# Patient Record
Sex: FEMALE | Race: WHITE | Employment: FULL TIME | URBAN - METROPOLITAN AREA
[De-identification: names, ages, dates, MRNs, and addresses within clinical notes are randomized per-mention and may not be internally consistent; named-entity substitution may affect disease eponyms.]

---

## 2018-04-04 PROBLEM — J30.9 ALLERGIC RHINITIS, UNSPECIFIED SEASONALITY, UNSPECIFIED TRIGGER: Status: ACTIVE | Noted: 2018-04-04

## 2018-04-11 PROBLEM — J30.1 CHRONIC SEASONAL ALLERGIC RHINITIS DUE TO POLLEN: Status: ACTIVE | Noted: 2018-04-11

## 2018-04-11 PROBLEM — J30.89 ALLERGIC RHINITIS DUE TO AMERICAN HOUSE DUST MITE: Status: ACTIVE | Noted: 2018-04-11

## 2018-09-13 ENCOUNTER — OFFICE VISIT (OUTPATIENT)
Dept: FAMILY MEDICINE CLINIC | Facility: CLINIC | Age: 43
End: 2018-09-13
Payer: MEDICAID

## 2018-09-13 VITALS
TEMPERATURE: 98 F | SYSTOLIC BLOOD PRESSURE: 112 MMHG | HEART RATE: 72 BPM | WEIGHT: 130 LBS | RESPIRATION RATE: 16 BRPM | BODY MASS INDEX: 21.66 KG/M2 | DIASTOLIC BLOOD PRESSURE: 74 MMHG | OXYGEN SATURATION: 99 % | HEIGHT: 65 IN

## 2018-09-13 DIAGNOSIS — J00 ACUTE NASOPHARYNGITIS: Primary | ICD-10-CM

## 2018-09-13 PROCEDURE — 99213 OFFICE O/P EST LOW 20 MIN: CPT | Performed by: NURSE PRACTITIONER

## 2018-09-14 NOTE — PROGRESS NOTES
CHIEF COMPLAINT:   Patient presents with:  Sinus Problem: headache, sinus pressure, runny nose, nose congestion, drainage x 1 dy took OTC and helped       HPI:   Rashaun Matamoros is a 41year old female who presents for sinus congestion for 1 day.   Symptoms ha SKIN: no rashes,no suspicious lesions  HEAD: atraumatic, normocephalic,  no tenderness on palpation of sinuses  EYES: conjunctiva clear, EOM intact  EARS: TM's clear gray, no bulging, no retraction, no fluid, bony landmarks visualized.    NOSE: nostrils pat · You breathe in a virus from the air. This can happen when someone with a cold sneezes or coughs near you. · You touch your eyes, nose, or mouth when your hand has a cold virus on it. This can happen if you touch an object that has the cold virus on it. · Cover your mouth and nose when you cough or sneeze. Throw away tissues after using them. · Disinfect things you touch often, such as phones and keyboards.    · Stay home when you have a cold. What are the possible complications of a cold virus?   Colds

## 2018-09-14 NOTE — PATIENT INSTRUCTIONS
Tylenol for discomfort as packet insert  Dayquil as packet insert  Nasal spray as previously prescribed   Rest and fluids    Follow-up if not improving         Understanding the Cold Virus  Colds are the most common illness that people get.  Most adults get them to children. Contact your healthcare provider if you have any questions about using cold medicines safely. Can a cold be prevented? You can help reduce the spread of cold viruses. This can help both you and others avoid getting colds.  Follow these t

## 2018-10-08 ENCOUNTER — HOSPITAL ENCOUNTER (OUTPATIENT)
Dept: MAMMOGRAPHY | Age: 43
Discharge: HOME | End: 2018-10-08
Attending: FAMILY MEDICINE
Payer: MEDICAID

## 2018-10-08 DIAGNOSIS — Z12.31 ENCOUNTER FOR SCREENING MAMMOGRAM FOR MALIGNANT NEOPLASM OF BREAST: ICD-10-CM

## 2018-10-08 PROCEDURE — 77063 BREAST TOMOSYNTHESIS BI: CPT | Performed by: FAMILY MEDICINE

## 2018-10-08 PROCEDURE — 77067 SCR MAMMO BI INCL CAD: CPT | Performed by: FAMILY MEDICINE

## 2018-10-08 PROCEDURE — 77067 SCR MAMMO BI INCL CAD: CPT

## 2018-10-08 PROCEDURE — 77063 BREAST TOMOSYNTHESIS BI: CPT

## 2018-12-11 PROCEDURE — 88175 CYTOPATH C/V AUTO FLUID REDO: CPT | Performed by: OBSTETRICS & GYNECOLOGY

## 2018-12-11 PROCEDURE — 87624 HPV HI-RISK TYP POOLED RSLT: CPT | Performed by: OBSTETRICS & GYNECOLOGY

## 2019-08-05 ENCOUNTER — OFFICE VISIT (OUTPATIENT)
Dept: FAMILY MEDICINE CLINIC | Facility: CLINIC | Age: 44
End: 2019-08-05
Payer: MEDICAID

## 2019-08-05 VITALS
RESPIRATION RATE: 17 BRPM | DIASTOLIC BLOOD PRESSURE: 70 MMHG | HEIGHT: 65 IN | HEART RATE: 69 BPM | TEMPERATURE: 99 F | OXYGEN SATURATION: 98 % | BODY MASS INDEX: 19.66 KG/M2 | SYSTOLIC BLOOD PRESSURE: 102 MMHG | WEIGHT: 118 LBS

## 2019-08-05 DIAGNOSIS — J30.2 SEASONAL ALLERGIC RHINITIS, UNSPECIFIED TRIGGER: Primary | ICD-10-CM

## 2019-08-05 PROCEDURE — 99213 OFFICE O/P EST LOW 20 MIN: CPT | Performed by: FAMILY MEDICINE

## 2019-08-06 NOTE — PROGRESS NOTES
CHIEF COMPLAINT:   Patient presents with:  URI: sore throat,runny nose and itchy ears since friday. HPI:   Cleopatra Hurtado is a 45year old female who presents for sore throat, runny nose, itchy ears. Pt reports sx x 3 days.    Gets allergy shots ever palpitations   GI: denies N/V/C or abdominal pain  NEURO: Denies headaches    EXAM:   /70 (BP Location: Right arm, Patient Position: Sitting, Cuff Size: adult)   Pulse 69   Temp 98.6 °F (37 °C) (Oral)   Resp 17   Ht 65\"   Wt 118 lb   SpO2 98%   BMI

## 2019-08-06 NOTE — PATIENT INSTRUCTIONS
Take claritin/allegra once daily. Saline nasal sprays to reduce congestion. If no better in 1 week follow-up for further evaluation.

## 2021-06-19 ENCOUNTER — HOSPITAL ENCOUNTER (OUTPATIENT)
Age: 46
Discharge: HOME | End: 2021-06-19
Payer: MEDICAID

## 2021-06-19 VITALS
OXYGEN SATURATION: 99 % | DIASTOLIC BLOOD PRESSURE: 80 MMHG | RESPIRATION RATE: 14 BRPM | SYSTOLIC BLOOD PRESSURE: 127 MMHG | HEART RATE: 79 BPM | TEMPERATURE: 98 F

## 2021-06-19 DIAGNOSIS — J04.0 ACUTE LARYNGITIS: ICD-10-CM

## 2021-06-19 DIAGNOSIS — J20.9 ACUTE BRONCHITIS, UNSPECIFIED ORGANISM: Primary | ICD-10-CM

## 2021-06-19 PROCEDURE — 99213 OFFICE O/P EST LOW 20 MIN: CPT | Performed by: PHYSICIAN ASSISTANT

## 2021-06-19 RX ORDER — ALBUTEROL SULFATE 90 UG/1
2 AEROSOL, METERED RESPIRATORY (INHALATION) EVERY 4 HOURS PRN
Qty: 6.7 G | Refills: 0 | Status: SHIPPED | OUTPATIENT
Start: 2021-06-19 | End: 2021-07-19

## 2021-06-19 RX ORDER — PREDNISONE 20 MG/1
40 TABLET ORAL DAILY
Qty: 10 TABLET | Refills: 0 | Status: SHIPPED | OUTPATIENT
Start: 2021-06-19 | End: 2021-06-24

## 2021-06-19 RX ORDER — BENZONATATE 100 MG/1
100 CAPSULE ORAL 3 TIMES DAILY PRN
Qty: 30 CAPSULE | Refills: 0 | Status: SHIPPED | OUTPATIENT
Start: 2021-06-19 | End: 2021-07-19

## 2021-06-19 NOTE — ED INITIAL ASSESSMENT (HPI)
C/o losing throat this past Wed. Now with increased nasal/throat congestion and coughing up mucous. Denies fever/chills. Denies shortness of breath. Reports mucous worse when trying to sleep.

## 2021-06-19 NOTE — ED PROVIDER NOTES
Patient Seen in: [facility] [address]      History   Patient presents with:  Cough: congested - Entered by patient    Stated Complaint: Cough - congested    HPI/Subjective:   HPI    Mikayla Kramer is a 46-year-old female who presents for evaluation of c above.    Physical Exam     ED Triage Vitals [06/19/21 1041]   /80   Pulse 79   Resp 14   Temp 98.1 °F (36.7 °C)   Temp src Oral   SpO2 99 %   O2 Device None (Room air)       Current:/80   Pulse 79   Temp 98.1 °F (36.7 °C) (Oral)   Resp 14   LM instructions are given and discussed. Discharge medications are discussed. The patient is in good condition throughout the visit today and remains so upon discharge. I discuss the plan of care with the patient, who expresses understanding.   All questions

## 2024-10-21 RX ORDER — SOLIFENACIN SUCCINATE 10 MG/1
10 TABLET, FILM COATED ORAL DAILY
COMMUNITY

## 2024-10-21 RX ORDER — MULTIVIT-MIN/IRON FUM/FOLIC AC 7.5 MG-4
1 TABLET ORAL AS NEEDED
COMMUNITY

## 2024-11-13 ENCOUNTER — ANESTHESIA EVENT (OUTPATIENT)
Dept: ENDOSCOPY | Facility: HOSPITAL | Age: 49
End: 2024-11-13
Payer: MEDICAID

## 2024-11-14 ENCOUNTER — ANESTHESIA (OUTPATIENT)
Dept: ENDOSCOPY | Facility: HOSPITAL | Age: 49
End: 2024-11-14
Payer: MEDICAID

## 2024-11-14 ENCOUNTER — HOSPITAL ENCOUNTER (OUTPATIENT)
Facility: HOSPITAL | Age: 49
Setting detail: HOSPITAL OUTPATIENT SURGERY
Discharge: HOME | End: 2024-11-14
Attending: INTERNAL MEDICINE | Admitting: INTERNAL MEDICINE
Payer: MEDICAID

## 2024-11-14 VITALS
RESPIRATION RATE: 20 BRPM | DIASTOLIC BLOOD PRESSURE: 55 MMHG | SYSTOLIC BLOOD PRESSURE: 106 MMHG | OXYGEN SATURATION: 92 % | BODY MASS INDEX: 23.39 KG/M2 | TEMPERATURE: 97 F | WEIGHT: 137 LBS | HEIGHT: 64 IN | HEART RATE: 74 BPM

## 2024-11-14 LAB — B-HCG UR QL: NEGATIVE

## 2024-11-14 PROCEDURE — 0DJD8ZZ INSPECTION OF LOWER INTESTINAL TRACT, VIA NATURAL OR ARTIFICIAL OPENING ENDOSCOPIC: ICD-10-PCS | Performed by: INTERNAL MEDICINE

## 2024-11-14 PROCEDURE — 81025 URINE PREGNANCY TEST: CPT

## 2024-11-14 RX ORDER — LIDOCAINE HYDROCHLORIDE 10 MG/ML
INJECTION, SOLUTION EPIDURAL; INFILTRATION; INTRACAUDAL; PERINEURAL AS NEEDED
Status: DISCONTINUED | OUTPATIENT
Start: 2024-11-14 | End: 2024-11-14 | Stop reason: SURG

## 2024-11-14 RX ORDER — NALOXONE HYDROCHLORIDE 0.4 MG/ML
0.08 INJECTION, SOLUTION INTRAMUSCULAR; INTRAVENOUS; SUBCUTANEOUS AS NEEDED
Status: DISCONTINUED | OUTPATIENT
Start: 2024-11-14 | End: 2024-11-14

## 2024-11-14 RX ORDER — SODIUM CHLORIDE, SODIUM LACTATE, POTASSIUM CHLORIDE, CALCIUM CHLORIDE 600; 310; 30; 20 MG/100ML; MG/100ML; MG/100ML; MG/100ML
INJECTION, SOLUTION INTRAVENOUS CONTINUOUS
Status: DISCONTINUED | OUTPATIENT
Start: 2024-11-14 | End: 2024-11-14

## 2024-11-14 RX ADMIN — LIDOCAINE HYDROCHLORIDE 50 MG: 10 INJECTION, SOLUTION EPIDURAL; INFILTRATION; INTRACAUDAL; PERINEURAL at 13:11:00

## 2024-11-14 RX ADMIN — SODIUM CHLORIDE, SODIUM LACTATE, POTASSIUM CHLORIDE, CALCIUM CHLORIDE: 600; 310; 30; 20 INJECTION, SOLUTION INTRAVENOUS at 13:11:00

## 2024-11-14 NOTE — H&P
History and Physical for Endoscopic Procedure      Vivi Henriquez Patient Status:  Hospital Outpatient Surgery    1975 MRN [id number]   Location [facility] Attending Michael Contreras MD   Hosp Day # 0 PCP Sanjeev Lorenz DO     Date of Consult:  24    Reason for Consultation:  Colon cancer screening      History of Present Illness:  Vivi Henriquez is a a(n) 49 year old female.     History:  Past Medical History:    Cervical dysplasia    KIANNA II (cervical intraepithelial neoplasia II)    Herpes simplex    Hx of motion sickness    LGSIL on Pap smear of cervix    Dr. Woo    Seasonal allergies    Visual impairment    contacts and glasses     Past Surgical History:   Procedure Laterality Date    Breast reconstruction      17 years ago, and then 11 years ago          x3    Colposcopy,bx cervix/endocerv curr      Other Bilateral     breast implants    Other      [facility] biopsy    Other      [place] teeth x4 removal    Other surgical history N/A 3/1/2016    Procedure: LOOP ELECTRO SURGICAL EXCISION;  Surgeon: Johanny Nieves MD;  Location:  MAIN OR     History reviewed. No pertinent family history.   reports that she has never smoked. She has never used smokeless tobacco. She reports current alcohol use. She reports that she does not use drugs.    Allergies:  Allergies[1]    Medications:    Current Facility-Administered Medications:     lactated ringers infusion, , Intravenous, Continuous    Review of Systems:  Gastrointestinal: negative other than specified in the HPI  General: negative other than specified in the HPI  Neurological: negative other than specified in the HPI  Cardiovascular: negative other than specified in the HPI  Respiratory: negative other than specified in the HPI    Physical Exam:  No acute distress  RRR  CTA B/L  SOFT +BS    Assessment/Plan:  Patient Active Problem List   Diagnosis    Allergic rhinitis, unspecified seasonality, unspecified trigger     Chronic seasonal allergic rhinitis due to pollen    Allergic rhinitis due to [employer] house dust mite       Colonoscopy today.    Michael Contreras MD  11/14/2024  12:37 PM         [1]   Allergies  Allergen Reactions    Ibuprofen OTHER (SEE COMMENTS)     Severe stomach pains    Latex      Itching, blisters    Nsaids      Severe abdominal pain    Vicodin [Hydrocodone-Acetaminophen] NAUSEA AND VOMITING     Nausea and vomiting.

## 2024-11-14 NOTE — ANESTHESIA POSTPROCEDURE EVALUATION
[facility]    Vivi Henriquez Patient Status:  Hospital Outpatient Surgery   Age/Gender 49 year old female MRN [id number]   Location [facility] ENDOSCOPY PAIN CENTER Attending Michael Contreras MD   Hosp Day # 0 PCP Sanjeev Lorenz DO       Anesthesia Post-op Note    COLONOSCOPY    Procedure Summary       Date: 11/14/24 Room / Location:  ENDOSCOPY 03 / [facility]    Anesthesia Start: 1307 Anesthesia Stop: 1331    Procedure: COLONOSCOPY Diagnosis: (diverticulosis, hemorrhoids)    Surgeons: Michael Contreras MD Anesthesiologist: Dany Marvin MD    Anesthesia Type: MAC ASA Status: 2            Anesthesia Type: MAC    Vitals Value Taken Time   BP 70/49 11/14/24 1340   Temp 98f 11/14/24 1344   Pulse 74 11/14/24 1344   Resp 18 11/14/24 1340   SpO2 99 % 11/14/24 1344   Vitals shown include unfiled device data.    Patient Location: Endoscopy    Anesthesia Type: MAC    Airway Patency: patent    Postop Pain Control: adequate    Mental Status: preanesthetic baseline    Nausea/Vomiting: none    Cardiopulmonary/Hydration status: stable euvolemic    Complications: no apparent anesthesia related complications    Postop vital signs: stable    Dental Exam: Unchanged from Preop    Patient to be discharged home when criteria met.

## 2024-11-14 NOTE — DISCHARGE INSTRUCTIONS
FINDINGS:  1.  Healthy colon.    2. There were small pockets in the colon called diverticulosis.  These are considered normal to have.  So long as you have not had any complications with these pockets such as significant rectal bleeding or infections called diverticulitis, then there is nothing recommended that you need to do.      PLAN:   Repeat in 10 years.      If you have any questions, please call us at [phone].    Thank you,  Michael Contreras MD        Home Care Instructions for Colonoscopy with Sedation    Diet:  - Resume your regular diet as tolerated unless otherwise instructed.  - Start with light meals to minimize bloating.  - Do not drink alcohol today.    Medication:  - If you have questions about resuming your normal medications, please contact your Primary Care Physician.    Activities:  - Take it easy today. Do not return to work today.  - Do not drive today.  - Do not operate any machinery today (including kitchen equipment).    Colonoscopy:  - You may notice some rectal \"spotting\" (a little blood on the toilet tissue) for a day or two after the exam. This is normal.  - If you experience any rectal bleeding (not spotting), persistent tenderness or sharp severe abdominal pains, oral temperature over 100 degrees Fahrenheit, light-headedness or dizziness, or any other problems, contact your doctor.      **If unable to reach your doctor, please go to the [facility] Emergency Room**    - Your referring physician will receive a full report of your examination.  - If you do not hear from your doctor's office within two weeks of your biopsy, please call them for your results.    You may be able to see your laboratory results in [employer] between 4 and 7 business days.  In some cases, your physician may not have viewed the results before they are released to [employer].  If you have questions regarding your results contact the physician who ordered the test/exam by phone or via [employer] by choosing \"Ask a  Medical Question.\"

## 2024-11-14 NOTE — OPERATIVE REPORT
Colonoscopy Operative Report    Vivi Henriquez Patient Status:  Hospital Outpatient Surgery    1975 MRN [id number]   [facility] ENDOSCOPY [facility]IN [facility] Attending Michael Contreras MD   Hosp Day #   0 PCP Sanjeev Lorenz DO     Pre-Operative Diagnosis: COLON CANCER SCREENING    Post-Operative Diagnosis:  Sigmoid diverticulosis.  Hemorrhoids      Procedure Performed: COLONOSCOPY     Informed Consent: Informed consent for both the procedure and sedation were obtained from the patient. The potentially life-threatening complications of sedation, bleeding,  perforation, transfusion or repeat endoscopy  were reviewed along with the possible need for hospitalization, surgical management, transfusion or repeat endoscopy should one of these complications arise. The patient understands and is agreeable to proceed.  Sedation Type: MAC-Patient received sedation with monitored anesthesia provided by an anesthesiologist  Moderate Sedation Time: None.  Deep sedation provided by anesthesia.  Cecum Withdrawal Time:  8 min  Date of previous colonoscopy: none    Procedure Description: The patient was placed in the left lateral decubitus position.  After careful digital rectal examination, the Adult colonoscope was inserted into the rectum and advanced to the level of the cecum under direct visualization. The cecum was identified by landmarks, including the appendiceal orifice and ileoceccal valve. Careful examination of the entire colon was performed during withdrawal of the endoscope. The scope was withdrawn to the rectum and retroflexion was performed.  The patient tolerated the procedure well with no immediate complications. The patient was transferred to the recovery area in stable condition.  Quality of Preparation: Adequate  Aronchick Bowel Prep Scale:  2 - good  Findings:   Sigmoid diverticulosis.  Hemorrhoids    Recommendations:   Repeat in 10 years.  Discharge:  The  patient was given an after visit summary detailing the procedure, findings, recommendations and follow up plans.     Michael Contreras MD  11/14/2024  12:37 PM

## 2024-11-14 NOTE — ANESTHESIA PREPROCEDURE EVALUATION
PRE-OP EVALUATION    Patient Name: Vivi Henriquez    Admit Diagnosis: COLON CANCER SCREENING    Pre-op Diagnosis: COLON CANCER SCREENING    COLONOSCOPY    Anesthesia Procedure: COLONOSCOPY    Surgeons and Role:     * Michael Contreras MD - Primary    Pre-op vitals reviewed.        Body mass index is 23.52 kg/m².    Current medications reviewed.  Hospital Medications:   lactated ringers infusion   Intravenous Continuous       Outpatient Medications:   Prescriptions Prior to Admission[1]    Allergies: Ibuprofen, Latex, Nsaids, and Vicodin [hydrocodone-acetaminophen]      Anesthesia Evaluation    Patient summary reviewed.    Anesthetic Complications  (-) history of anesthetic complications         GI/Hepatic/Renal                                 Cardiovascular    Negative cardiovascular ROS.    Exercise tolerance: good     MET: >4                                           Endo/Other    Negative endo/other ROS.                              Pulmonary    Negative pulmonary ROS.                       Neuro/Psych                                      Past Surgical History:   Procedure Laterality Date    Breast reconstruction      17 years ago, and then 11 years ago          x3    Colposcopy,bx cervix/endocerv curr      Other Bilateral 2008    breast implants    Other      [facility] biopsy    Other      [place] teeth x4 removal    Other surgical history N/A 3/1/2016    Procedure: LOOP ELECTRO SURGICAL EXCISION;  Surgeon: Johanny Nieves MD;  Location:  MAIN OR     Social History     Socioeconomic History    Marital status:    Tobacco Use    Smoking status: Never    Smokeless tobacco: Never   Vaping Use    Vaping status: Never Used   Substance and Sexual Activity    Alcohol use: Yes     Comment: socially    Drug use: No    Sexual activity: Yes     Partners: Male     Birth control/protection: Pill     History   Drug Use No     Available pre-op labs reviewed.               Airway      Mallampati: II  Mouth opening: 3  FB  TM distance: 4 - 6 cm  Neck ROM: full Cardiovascular      Rhythm: regular  Rate: normal     Dental             Pulmonary      Breath sounds clear to auscultation bilaterally.               Other findings              ASA: 2   Plan: MAC  NPO status verified and patient meets guidelines.  Patient has not taken beta blockers in last 24 hours.        Plan/risks discussed with: patient                Present on Admission:  **None**             [1]   Medications Prior to Admission   Medication Sig Dispense Refill Last Dose/Taking    Solifenacin Succinate 10 MG Oral Tab Take 1 tablet (10 mg total) by mouth daily.   Past Week    Multiple Vitamins-Minerals (MULTI-VITAMIN/MINERALS) Oral Tab Take 1 tablet by mouth as needed.   Past Week    Aluminum Chloride 20 % External Solution Apply 1 Application topically nightly as needed. Please dispense dabber 35 mL 1

## (undated) DEVICE — 10FT COMBINED O2 DELIVERY/CO2 MONITORING. FILTER WITH MICROSTREAM TYPE LUER: Brand: DUAL ADULT NASAL CANNULA

## (undated) DEVICE — 3M™ RED DOT™ MONITORING ELECTRODE WITH FOAM TAPE AND STICKY GEL, 50/BAG, 20/CASE, 72/PLT 2570: Brand: RED DOT™

## (undated) DEVICE — KIT VLV 5 PC AIR H2O SUCT BX ENDOGATOR CONN

## (undated) DEVICE — KIT CUSTOM ENDOPROCEDURE STERIS

## (undated) DEVICE — 1200CC GUARDIAN II: Brand: GUARDIAN

## (undated) DEVICE — V2 SPECIMEN COLLECTION MANIFOLD KIT: Brand: NEPTUNE

## (undated) NOTE — LETTER
Date & Time: 6/19/2021, 11:33 AM  Patient: Renetta Choi  Encounter Provider(s):    Louie Nicholson PA-C       To Whom It May Concern:    Nymelony Smithrhea was seen and treated in our department on 6/19/2021. Please excuse her on 6/19-6/20/21.   She should not ret